# Patient Record
Sex: FEMALE | Race: WHITE | Employment: FULL TIME | URBAN - NONMETROPOLITAN AREA
[De-identification: names, ages, dates, MRNs, and addresses within clinical notes are randomized per-mention and may not be internally consistent; named-entity substitution may affect disease eponyms.]

---

## 2020-06-25 ENCOUNTER — OFFICE VISIT (OUTPATIENT)
Dept: FAMILY MEDICINE | Facility: OTHER | Age: 45
End: 2020-06-25
Attending: PHYSICIAN ASSISTANT

## 2020-06-25 VITALS
RESPIRATION RATE: 16 BRPM | DIASTOLIC BLOOD PRESSURE: 82 MMHG | SYSTOLIC BLOOD PRESSURE: 146 MMHG | HEART RATE: 80 BPM | TEMPERATURE: 98.3 F | WEIGHT: 139 LBS

## 2020-06-25 DIAGNOSIS — R31.9 HEMATURIA, UNSPECIFIED TYPE: ICD-10-CM

## 2020-06-25 DIAGNOSIS — M54.42 ACUTE LEFT-SIDED LOW BACK PAIN WITH LEFT-SIDED SCIATICA: Primary | ICD-10-CM

## 2020-06-25 LAB
ALBUMIN UR-MCNC: 30 MG/DL
AMORPH CRY #/AREA URNS HPF: ABNORMAL /HPF
APPEARANCE UR: ABNORMAL
BACTERIA #/AREA URNS HPF: ABNORMAL /HPF
BILIRUB UR QL STRIP: NEGATIVE
COLOR UR AUTO: YELLOW
GLUCOSE UR STRIP-MCNC: NEGATIVE MG/DL
HGB UR QL STRIP: ABNORMAL
HYALINE CASTS #/AREA URNS LPF: 1 /LPF (ref 0–2)
KETONES UR STRIP-MCNC: NEGATIVE MG/DL
LEUKOCYTE ESTERASE UR QL STRIP: ABNORMAL
MUCOUS THREADS #/AREA URNS LPF: PRESENT /LPF
NITRATE UR QL: NEGATIVE
PH UR STRIP: 7 PH (ref 5–7)
RBC #/AREA URNS AUTO: 17 /HPF (ref 0–2)
SOURCE: ABNORMAL
SP GR UR STRIP: 1.03 (ref 1–1.03)
SQUAMOUS #/AREA URNS AUTO: 13 /HPF (ref 0–1)
UROBILINOGEN UR STRIP-MCNC: 2 MG/DL (ref 0–2)
WBC #/AREA URNS AUTO: 7 /HPF (ref 0–5)

## 2020-06-25 PROCEDURE — 99213 OFFICE O/P EST LOW 20 MIN: CPT | Performed by: PHYSICIAN ASSISTANT

## 2020-06-25 PROCEDURE — 81001 URINALYSIS AUTO W/SCOPE: CPT | Mod: ZL | Performed by: PHYSICIAN ASSISTANT

## 2020-06-25 ASSESSMENT — PAIN SCALES - GENERAL: PAINLEVEL: EXTREME PAIN (8)

## 2020-06-25 NOTE — NURSING NOTE
Chief Complaint   Patient presents with     Musculoskeletal Problem     lower right back     Patient is here for pain in her lower back-right side that started Yesterday. Patient states she slipped on Tuesday but did not fall. Patient states she does not know of any injury to it. Patient states walking/standing helps. Patient states she has tried ice and heat with ice helping. Patient has also been taking 800mg Ibuprofen with the last dose at 10AM today.    Initial BP (!) 146/82   Pulse 80   Temp 98.3  F (36.8  C) (Tympanic)   Resp 16   Wt 63 kg (139 lb)  There is no height or weight on file to calculate BMI.  Medication Reconciliation: complete    Johana Shepherd LPN

## 2020-06-25 NOTE — PROGRESS NOTES
SUBJECTIVE:  Lucas Horton is a 45 year old female presents to clinic for evaluation of low back pain  Onset yesterday, course is gradually worsening  Mechanism of injury: she slipped on the stairs and she caught herself, jarring her back.   Associated symptoms - low back pain on right, radiates to her buttock    Location - right low back and SI joing  Quality - constant ache, with intermittent sharp pains  Severity - 6/10 to 10/10  Aggravated by - laying down, sitting still and turning  Alleviated by - walking  Treatments - ice, heat, ibuprofen 800 - last dose 3 hours PTA.   Prior fracture or injury - no  Normal urine and normal stool  No history of kidney stone    No prior history of back pain  Red flags: negative for incontinence, saddle paresthesia, blood in urine or stool, unintentional weight loss, night sweats, history of cancer      No past medical history on file.  No current outpatient medications on file.     No current facility-administered medications for this visit.       No Known Allergies      ROS  General: feels well, no fever  Musculoskeletal: injury per HPI      OBJECTIVE:  Vitals:    06/25/20 1241   BP: (!) 146/82   Pulse: 80   Resp: 16   Temp: 98.3  F (36.8  C)   TempSrc: Tympanic   Weight: 63 kg (139 lb)     Vital signs as noted above.  Appearance: moderate distress with position change  Cardiac: normal rate/rhythm, no murmur  Respiratory: normal respiration, clear to ausculation  Abdomen: non tender, no CVAT  Musculoskeletal: normal posture, no noted abnormality. No erythema/ecchymosis/swelling  Palpation: non tender on exam  Neurovascular: normal pulses, cap refill, sensation to soft touch, temperature  Neurological: Alert, oriented x 3,4/5 strength right lower extremity, 5/5 strength left lower extremity, reflexes are symmetrical. Normal gait. Normal heel toe walking. Normal MICHAEL's, Romberg negative,   Straight leg is negative  ROM limited rotation and lateral bend related to  discomfort    Results for orders placed or performed in visit on 06/25/20   UA reflex to Microscopic and Culture     Status: Abnormal    Specimen: Midstream Urine   Result Value Ref Range    Color Urine Yellow     Appearance Urine Slightly Cloudy     Glucose Urine Negative NEG^Negative mg/dL    Bilirubin Urine Negative NEG^Negative    Ketones Urine Negative NEG^Negative mg/dL    Specific Gravity Urine 1.034 1.003 - 1.035    Blood Urine Small (A) NEG^Negative    pH Urine 7.0 5.0 - 7.0 pH    Protein Albumin Urine 30 (A) NEG^Negative mg/dL    Urobilinogen mg/dL 2.0 0.0 - 2.0 mg/dL    Nitrite Urine Negative NEG^Negative    Leukocyte Esterase Urine Small (A) NEG^Negative    Source Midstream Urine     RBC Urine 17 (H) 0 - 2 /HPF    WBC Urine 7 (H) 0 - 5 /HPF    Bacteria Urine Few (A) NEG^Negative /HPF    Squamous Epithelial /HPF Urine 13 (H) 0 - 1 /HPF    Mucous Urine Present (A) NEG^Negative /LPF    Hyaline Casts 1 0 - 2 /LPF    Amorphous Crystals Few (A) NEG^Negative /HPF         ASSESSMENT:   Diagnosis Comments   1. Acute left-sided low back pain with left-sided sciatica  UA reflex to Microscopic and Culture    2. Hematuria, unspecified type  Possible CT to rule out stone       PLAN:  Acute left sided low back pain with radiation to buttock. She slipped on stairs 2 days ago. Pain started 1 day ago.   On exam - no noted abnormality on visualization. Not TTP. Pain is reproduced with movement.   Urinalysis obtained - positive for blood, small LE, RBC 17, WBC 7, few bacteria, Squamous (H) - contaminated, amorphous crystals.   Patient left clinic after her urinalysis to give some keys to her  with the plan to return to room and wait for results/recommendations. She did not return. We are also not able to reach her by phone. There is no voice mail options.  Emergency contact phone number also tried - states a different persons than the one listed on chart (wrong number).   Hematuria and low back pain. Possibility of  kidney stone. Unable to reach patient with this information at patient listed number and her emergency contact number. Would suggest possible CT imaging to rule this out today. Hopefully patient calls back in to clinic.     Elvia Moore PA-C on 6/25/2020 at 5:10 PM

## 2020-06-25 NOTE — PATIENT INSTRUCTIONS
Left sided low back pain, onset yesterday, course worsening  No history of low back pain  She slipped on stairs 3 days ago  On exam no pain with palpation. Pain is reproduced with movement  Urinalysis: